# Patient Record
Sex: FEMALE | Race: BLACK OR AFRICAN AMERICAN | Employment: FULL TIME | URBAN - METROPOLITAN AREA
[De-identification: names, ages, dates, MRNs, and addresses within clinical notes are randomized per-mention and may not be internally consistent; named-entity substitution may affect disease eponyms.]

---

## 2017-09-15 ENCOUNTER — OP HISTORICAL/CONVERTED ENCOUNTER (OUTPATIENT)
Dept: OTHER | Age: 52
End: 2017-09-15

## 2018-10-05 ENCOUNTER — OP HISTORICAL/CONVERTED ENCOUNTER (OUTPATIENT)
Dept: OTHER | Age: 53
End: 2018-10-05

## 2019-10-08 ENCOUNTER — OP HISTORICAL/CONVERTED ENCOUNTER (OUTPATIENT)
Dept: OTHER | Age: 54
End: 2019-10-08

## 2019-10-18 ENCOUNTER — OP HISTORICAL/CONVERTED ENCOUNTER (OUTPATIENT)
Dept: OTHER | Age: 54
End: 2019-10-18

## 2020-07-08 ENCOUNTER — OP HISTORICAL/CONVERTED ENCOUNTER (OUTPATIENT)
Dept: OTHER | Age: 55
End: 2020-07-08

## 2021-04-01 ENCOUNTER — OFFICE VISIT (OUTPATIENT)
Dept: OBGYN CLINIC | Age: 56
End: 2021-04-01
Payer: COMMERCIAL

## 2021-04-01 VITALS
OXYGEN SATURATION: 100 % | HEART RATE: 69 BPM | HEIGHT: 67 IN | BODY MASS INDEX: 45.99 KG/M2 | TEMPERATURE: 96.4 F | DIASTOLIC BLOOD PRESSURE: 80 MMHG | SYSTOLIC BLOOD PRESSURE: 118 MMHG | RESPIRATION RATE: 16 BRPM | WEIGHT: 293 LBS

## 2021-04-01 DIAGNOSIS — Z12.4 ENCOUNTER FOR SCREENING FOR MALIGNANT NEOPLASM OF CERVIX: ICD-10-CM

## 2021-04-01 DIAGNOSIS — R10.2 PELVIC PAIN IN FEMALE: ICD-10-CM

## 2021-04-01 DIAGNOSIS — Z01.419 GYNECOLOGIC EXAM NORMAL: Primary | ICD-10-CM

## 2021-04-01 LAB
HCG URINE, QL. (POC): POSITIVE
VALID INTERNAL CONTROL?: YES

## 2021-04-01 PROCEDURE — 81025 URINE PREGNANCY TEST: CPT | Performed by: OBSTETRICS & GYNECOLOGY

## 2021-04-01 PROCEDURE — 99396 PREV VISIT EST AGE 40-64: CPT | Performed by: OBSTETRICS & GYNECOLOGY

## 2021-04-01 RX ORDER — ISOSORBIDE MONONITRATE 30 MG/1
TABLET, EXTENDED RELEASE ORAL DAILY
COMMUNITY

## 2021-04-01 RX ORDER — ASPIRIN 81 MG/1
TABLET ORAL DAILY
COMMUNITY

## 2021-04-01 RX ORDER — LOSARTAN POTASSIUM AND HYDROCHLOROTHIAZIDE 25; 100 MG/1; MG/1
1 TABLET ORAL DAILY
COMMUNITY

## 2021-04-01 RX ORDER — ROSUVASTATIN CALCIUM 20 MG/1
20 TABLET, COATED ORAL
COMMUNITY

## 2021-04-01 RX ORDER — METOPROLOL TARTRATE 100 MG/1
TABLET ORAL 2 TIMES DAILY
COMMUNITY

## 2021-04-01 NOTE — PATIENT INSTRUCTIONS

## 2021-04-01 NOTE — PROGRESS NOTES
Geetha King is a 50 y.o. female, , No LMP recorded. (Menstrual status: Menopause). , who presents today for the following:  Chief Complaint   Patient presents with    Annual Exam        No Known Allergies    Current Outpatient Medications   Medication Sig    rosuvastatin (CRESTOR) 20 mg tablet Take 20 mg by mouth nightly.  losartan-hydroCHLOROthiazide (HYZAAR) 100-25 mg per tablet Take 1 Tab by mouth daily.  isosorbide mononitrate ER (IMDUR) 30 mg tablet Take  by mouth daily.  metoprolol tartrate (LOPRESSOR) 100 mg IR tablet Take  by mouth two (2) times a day.  aspirin delayed-release 81 mg tablet Take  by mouth daily. No current facility-administered medications for this visit. Past Medical History:   Diagnosis Date    Heart disease     Hypercholesterolemia     Hypertension        History reviewed. No pertinent surgical history.     Family History   Problem Relation Age of Onset   [facility] Cancer Mother     Hypertension Mother     High Cholesterol Mother        Social History     Socioeconomic History    Marital status: SINGLE     Spouse name: Not on file    Number of children: Not on file    Years of education: Not on file    Highest education level: Not on file   Occupational History    Not on file   Social Needs    Financial resource strain: Not on file    Food insecurity     Worry: Not on file     Inability: Not on file    Transportation needs     Medical: Not on file     Non-medical: Not on file   Tobacco Use    Smoking status: Never Smoker    Smokeless tobacco: Never Used   Substance and Sexual Activity    Alcohol use: Not Currently    Drug use: Never    Sexual activity: Yes   Lifestyle    Physical activity     Days per week: Not on file     Minutes per session: Not on file    Stress: Not on file   Relationships    Social connections     Talks on phone: Not on file     Gets together: Not on file     Attends Jainism service: Not on file     Active member of club or organization: Not on file     Attends meetings of clubs or organizations: Not on file     Relationship status: Not on file    Intimate partner violence     Fear of current or ex partner: Not on file     Emotionally abused: Not on file     Physically abused: Not on file     Forced sexual activity: Not on file   Other Topics Concern    Not on file   Social History Narrative    Not on file         HPI  Annual exam  Doing well  Reported a one day episode of heavy vaginal bleeding  Was seen in the ER - no abnormalities found per patient  Reports occassional pelvic pain    Review of Systems   Constitutional: Negative. Respiratory: Negative. Cardiovascular: Negative. Gastrointestinal: Positive for abdominal pain. Genitourinary: Negative. Musculoskeletal: Negative. Skin: Negative. Neurological: Negative. Endo/Heme/Allergies: Negative. Psychiatric/Behavioral: Negative. All other systems reviewed and are negative. /80 (BP 1 Location: Left upper arm)   Pulse 69   Temp (!) 96.4 °F (35.8 °C)   Resp 16   Ht 5' 7\" (1.702 m)   Wt 307 lb (139.3 kg)   SpO2 100%   BMI 48.08 kg/m²    OBGyn Exam   Constitutional:   Chaperone: accepted and present. General Appearance: healthy-appearing, well-nourished, and well-developed; Level of Distress: NAD. Ambulation: ambulating normally. Psychiatric:   Insight: good judgement. Mental Status: normal mood and affect and active and alert. Orientation: to time, place, and person. Memory: recent memory normal and remote memory normal.     Head: Head: normocephalic and atraumatic. Neck:   Neck: supple, FROM, trachea midline, and no masses. Lymph Nodes: no cervical LAD, supraclavicular LAD, axillary LAD, or inguinal LAD. Thyroid: no enlargement or nodules and non-tender. Lungs:   Respiratory effort: no dyspnea. Cardiovascular:     Pulses including femoral / pedal: normal throughout.      Breast: Breast: no masses or abnormal secretions and normal appearance. Abdomen:    no tenderness, guarding, masses, rebound tenderness, or CVA tenderness and non-distended. Female :   External genitalia: no lesions or rash and normal.   Vagina: moist mucosa;  Cervix: no discharge, inflammation, or cervical motion tenderness   Uterus: midline, smooth, and non-tender;   Adnexae: no tenderness    Bladder and Urethra: normal bladder and urethra (except where noted). Musculoskeletal[de-identified]   Motor Strength and Tone: normal tone and motor strength. Joints, Bones, and Muscles: no contractures, malalignment, tenderness, or bony abnormalities and normal movement of all extremities. Extremities: no cyanosis, edema, varicosities, or palpable cord. Skin:   Inspection and palpation: no rash, lesions, ulcer, induration, nodules, jaundice, or abnormal nevi and good turgor. Nails: normal.     Back: Thoracolumbar Appearance: normal curvature. 1. Gynecologic exam normal    - AMB POC URINE PREGNANCY TEST, VISUAL COLOR COMPARISON  - PAP IG, CT-NG, RFX APTIMA HPV ASCUS ([id number], [id number])    2. Encounter for screening for malignant neoplasm of cervix      3.  Pelvic pain in female    - US PELV NON OBS; Future

## 2021-04-05 LAB
C TRACH RRNA CVX QL NAA+PROBE: NEGATIVE
CYTOLOGIST CVX/VAG CYTO: NORMAL
CYTOLOGY CVX/VAG DOC CYTO: NORMAL
CYTOLOGY CVX/VAG DOC THIN PREP: NORMAL
DX ICD CODE: NORMAL
LABCORP, 190119: NORMAL
Lab: NORMAL
Lab: NORMAL
N GONORRHOEA RRNA CVX QL NAA+PROBE: NEGATIVE
OTHER STN SPEC: NORMAL
STAT OF ADQ CVX/VAG CYTO-IMP: NORMAL

## 2021-08-27 ENCOUNTER — TRANSCRIBE ORDER (OUTPATIENT)
Dept: SCHEDULING | Age: 56
End: 2021-08-27

## 2021-08-27 DIAGNOSIS — Z12.31 SCREENING MAMMOGRAM FOR HIGH-RISK PATIENT: Primary | ICD-10-CM

## 2022-04-11 ENCOUNTER — OFFICE VISIT (OUTPATIENT)
Dept: OBGYN CLINIC | Age: 57
End: 2022-04-11
Payer: COMMERCIAL

## 2022-04-11 VITALS
HEART RATE: 58 BPM | OXYGEN SATURATION: 99 % | HEIGHT: 67 IN | WEIGHT: 293 LBS | SYSTOLIC BLOOD PRESSURE: 172 MMHG | DIASTOLIC BLOOD PRESSURE: 71 MMHG | RESPIRATION RATE: 16 BRPM | BODY MASS INDEX: 45.99 KG/M2

## 2022-04-11 DIAGNOSIS — Z12.4 ENCOUNTER FOR SCREENING FOR MALIGNANT NEOPLASM OF CERVIX: ICD-10-CM

## 2022-04-11 DIAGNOSIS — N76.0 VAGINITIS AND VULVOVAGINITIS: ICD-10-CM

## 2022-04-11 DIAGNOSIS — Z12.31 SCREENING MAMMOGRAM FOR BREAST CANCER: ICD-10-CM

## 2022-04-11 DIAGNOSIS — N36.8 SKENE'S DUCT CYST: ICD-10-CM

## 2022-04-11 DIAGNOSIS — Z01.419 GYNECOLOGIC EXAM NORMAL: Primary | ICD-10-CM

## 2022-04-11 DIAGNOSIS — R10.30 LOWER ABDOMINAL PAIN: ICD-10-CM

## 2022-04-11 PROCEDURE — 99396 PREV VISIT EST AGE 40-64: CPT | Performed by: OBSTETRICS & GYNECOLOGY

## 2022-04-11 RX ORDER — CEPHALEXIN 500 MG/1
500 CAPSULE ORAL 2 TIMES DAILY
Qty: 20 CAPSULE | Refills: 0 | Status: SHIPPED | OUTPATIENT
Start: 2022-04-11 | End: 2022-04-21

## 2022-04-11 RX ORDER — TERCONAZOLE 4 MG/G
1 CREAM VAGINAL
Qty: 45 G | Refills: 0 | Status: SHIPPED | OUTPATIENT
Start: 2022-04-11

## 2022-04-15 LAB
CYTOLOGIST CVX/VAG CYTO: NORMAL
CYTOLOGY CVX/VAG DOC CYTO: NORMAL
CYTOLOGY CVX/VAG DOC THIN PREP: NORMAL
DX ICD CODE: NORMAL
LABCORP, 190119: NORMAL
Lab: NORMAL
OTHER STN SPEC: NORMAL
STAT OF ADQ CVX/VAG CYTO-IMP: NORMAL

## 2022-04-17 NOTE — PROGRESS NOTES
Care Due:                  Date            Visit Type   Department     Provider  --------------------------------------------------------------------------------                                MYCHART                              ANNUAL                              CHECKUP/PHY  [facility] INTERNAL  Last Visit: 10-      [facility]       Bri Freedman  Next Visit: None Scheduled  None         None Found                                                            Last  Test          Frequency    Reason                     Performed    Due Date  --------------------------------------------------------------------------------    HBA1C.......  6 months...  metFORMIN................  10-   04-    Powered by [employer]. Reference number: [id number].   4/01/2022 10:22:12 AM CDT   Tiburcio Lehman is a 49 y.o. female, , No LMP recorded. (Menstrual status: Menopause). , who presents today for the following:  Chief Complaint   Patient presents with    Annual Exam     Pt here to get results of u/s too. No Known Allergies    Current Outpatient Medications   Medication Sig    terconazole (TERAZOL 7) 0.4 % vaginal cream Insert 1 Applicator into vagina nightly.  cephALEXin (KEFLEX) 500 mg capsule Take 1 Capsule by mouth two (2) times a day for 10 days.  rosuvastatin (CRESTOR) 20 mg tablet Take 20 mg by mouth nightly.  losartan-hydroCHLOROthiazide (HYZAAR) 100-25 mg per tablet Take 1 Tab by mouth daily.  isosorbide mononitrate ER (IMDUR) 30 mg tablet Take  by mouth daily.  metoprolol tartrate (LOPRESSOR) 100 mg IR tablet Take  by mouth two (2) times a day.  aspirin delayed-release 81 mg tablet Take  by mouth daily. No current facility-administered medications for this visit. Past Medical History:   Diagnosis Date    Heart disease     Hypercholesterolemia     Hypertension        History reviewed. No pertinent surgical history.     Family History   Problem Relation Age of Onset   García Cancer Mother     Hypertension Mother     High Cholesterol Mother        Social History     Socioeconomic History    Marital status: SINGLE     Spouse name: Not on file    Number of children: Not on file    Years of education: Not on file    Highest education level: Not on file   Occupational History    Not on file   Tobacco Use    Smoking status: Never Smoker    Smokeless tobacco: Never Used   Substance and Sexual Activity    Alcohol use: Not Currently    Drug use: Never    Sexual activity: Yes   Other Topics Concern    Not on file   Social History Narrative    Not on file     Social Determinants of Health     Financial Resource Strain:     Difficulty of Paying Living Expenses: Not on file   Food Insecurity:     Worried About Running Out of Food in the Last Year: Not on file    [address] in the Last Year: Not on file   Transportation Needs:     Lack of Transportation (Medical): Not on file    Lack of Transportation (Non-Medical): Not on file   Physical Activity:     Days of Exercise per Week: Not on file    Minutes of Exercise per Session: Not on file   Stress:     Feeling of Stress : Not on file   Social Connections:     Frequency of Communication with Friends and Family: Not on file    Frequency of Social Gatherings with Friends and Family: Not on file    Attends Jainism Services: Not on file    Active Member of [address] [place] [address] [employer] or Organizations: Not on file    Attends Club or Organization Meetings: Not on file    Marital Status: Not on file   Intimate Partner Violence:     Fear of Current or Ex-Partner: Not on file    Emotionally Abused: Not on file    Physically Abused: Not on file    Sexually Abused: Not on file   Housing Stability:     Unable to Pay for Housing in the Last Year: Not on file    Number of [place] in the Last Year: Not on file    Unstable Housing in the Last Year: Not on file         HPI  Annual exam   Reports intermittent lower abdominal pain     Review of Systems   Constitutional: Negative. Respiratory: Negative. Cardiovascular: Negative. Gastrointestinal: Positive for abdominal pain. Genitourinary: Negative. Musculoskeletal: Negative. Skin: Negative. Neurological: Negative. Endo/Heme/Allergies: Negative. Psychiatric/Behavioral: Negative. All other systems reviewed and are negative. BP (!) 172/71 (BP 1 Location: Right arm, BP Patient Position: Sitting)   Pulse (!) 58   Resp 16   Ht 5' 7\" (1.702 m)   Wt 302 lb (137 kg)   SpO2 99%   BMI 47.30 kg/m²    OBGyn Exam   Constitutional:     General Appearance: healthy-appearing, well-nourished, and well-developed; Level of Distress: NAD. Ambulation: ambulating normally. Psychiatric:   Insight: good judgement.    Mental Status: normal mood and affect and active and alert. Orientation: to time, place, and person. Memory: recent memory normal and remote memory normal.     Head: Head: normocephalic and atraumatic. Neck:   Neck: supple, FROM, trachea midline, and no masses. Lymph Nodes: no cervical LAD, supraclavicular LAD, axillary LAD, or inguinal LAD. Thyroid: no enlargement or nodules and non-tender. Lungs:   Respiratory effort: no dyspnea. Cardiovascular:     Pulses including femoral / pedal: normal throughout. Breast: Breast: no masses or abnormal secretions and normal appearance. Abdomen:    no tenderness, guarding, masses, rebound tenderness, or CVA tenderness and non-distended. Female :   External genitalia: no lesions or rash and normal. skenes gland cyst  Vagina: moist mucosa;  discharge. Cervix: no discharge, inflammation, or cervical motion tenderness   Uterus:  non-tender;  Adnexae: no  tenderness. Bladder and Urethra: normal bladder and urethra (except where noted). 1. Gynecologic exam normal    - PAP IG, RFX APTIMA HPV ASCUS ([id number])    2. Encounter for screening for malignant neoplasm of cervix    3. Screening mammogram for breast cancer    - [facility] MAMMO BI SCREENING INCL CAD; Future    4. Vaginitis and vulvovaginitis    - terconazole (TERAZOL 7) 0.4 % vaginal cream; Insert 1 Applicator into vagina nightly. Dispense: 45 g; Refill: 0    5. [place]'s duct cyst    - cephALEXin (KEFLEX) 500 mg capsule; Take 1 Capsule by mouth two (2) times a day for 10 days. Dispense: 20 Capsule; Refill: 0    6.  Lower abdominal pain    - CT ABD W WO CONT; Future

## 2022-04-26 ENCOUNTER — OFFICE VISIT (OUTPATIENT)
Dept: OBGYN CLINIC | Age: 57
End: 2022-04-26
Payer: COMMERCIAL

## 2022-04-26 VITALS
WEIGHT: 293 LBS | DIASTOLIC BLOOD PRESSURE: 61 MMHG | RESPIRATION RATE: 16 BRPM | OXYGEN SATURATION: 96 % | HEART RATE: 77 BPM | SYSTOLIC BLOOD PRESSURE: 114 MMHG | BODY MASS INDEX: 45.99 KG/M2 | HEIGHT: 67 IN

## 2022-04-26 DIAGNOSIS — N36.8 SKENE'S DUCT CYST: Primary | ICD-10-CM

## 2022-04-26 PROCEDURE — 56405 I&D VULVA/PERINEAL ABSCESS: CPT | Performed by: OBSTETRICS & GYNECOLOGY

## 2022-04-26 PROCEDURE — 99213 OFFICE O/P EST LOW 20 MIN: CPT | Performed by: OBSTETRICS & GYNECOLOGY

## 2022-04-26 RX ORDER — CLINDAMYCIN PHOSPHATE 10 MG/G
GEL TOPICAL 2 TIMES DAILY
Qty: 1 ML | Refills: 0 | Status: SHIPPED | OUTPATIENT
Start: 2022-04-26

## 2022-04-30 NOTE — PROGRESS NOTES
Rohan Jean is a 49 y.o. female, , No LMP recorded. (Menstrual status: Menopause). , who presents today for the following:  Chief Complaint   Patient presents with    Follow-up     Pt states that the cyst did not go away. No Known Allergies    Current Outpatient Medications   Medication Sig    clindamycin (CLINDAGEL) 1 % topical gel Apply  to affected area two (2) times a day. use thin film on affected area    rosuvastatin (CRESTOR) 20 mg tablet Take 20 mg by mouth nightly.  losartan-hydroCHLOROthiazide (HYZAAR) 100-25 mg per tablet Take 1 Tab by mouth daily.  isosorbide mononitrate ER (IMDUR) 30 mg tablet Take  by mouth daily.  metoprolol tartrate (LOPRESSOR) 100 mg IR tablet Take  by mouth two (2) times a day.  aspirin delayed-release 81 mg tablet Take  by mouth daily.  terconazole (TERAZOL 7) 0.4 % vaginal cream Insert 1 Applicator into vagina nightly. (Patient not taking: Reported on 2022)     No current facility-administered medications for this visit. Past Medical History:   Diagnosis Date    Heart disease     Hypercholesterolemia     Hypertension        History reviewed. No pertinent surgical history.     Family History   Problem Relation Age of Onset   [facility] Cancer Mother     Hypertension Mother     High Cholesterol Mother        Social History     Socioeconomic History    Marital status: SINGLE     Spouse name: Not on file    Number of children: Not on file    Years of education: Not on file    Highest education level: Not on file   Occupational History    Not on file   Tobacco Use    Smoking status: Never Smoker    Smokeless tobacco: Never Used   Substance and Sexual Activity    Alcohol use: Not Currently    Drug use: Never    Sexual activity: Yes   Other Topics Concern    Not on file   Social History Narrative    Not on file     Social Determinants of Health     Financial Resource Strain:     Difficulty of Paying Living Expenses: Not on file   Food Insecurity:     Worried About Running Out of Food in the Last Year: Not on file    [employer] in the Last Year: Not on file   Transportation Needs:     Lack of Transportation (Medical): Not on file    Lack of Transportation (Non-Medical): Not on file   Physical Activity:     Days of Exercise per Week: Not on file    Minutes of Exercise per Session: Not on file   Stress:     Feeling of Stress : Not on file   Social Connections:     Frequency of Communication with Friends and Family: Not on file    Frequency of Social Gatherings with Friends and Family: Not on file    Attends Taoist Services: Not on file    Active Member of [address] [place] [address] [employer] or Organizations: Not on file    Attends Club or Organization Meetings: Not on file    Marital Status: Not on file   Intimate Partner Violence:     Fear of Current or Ex-Partner: Not on file    Emotionally Abused: Not on file    Physically Abused: Not on file    Sexually Abused: Not on file   Housing Stability:     Unable to Pay for Housing in the Last Year: Not on file    Number of [place] in the Last Year: Not on file    Unstable Housing in the Last Year: Not on file         HPI   Patient presents presents for follow up  Previously dx with skene's duct cyst  Complete treatment of antibiotics. Reports cyst still present although has decreased minimally with antibiotic therapy  Some vaginal discomfort      Review of Systems   Constitutional: Negative. Respiratory: Negative. Cardiovascular: Negative. Gastrointestinal: Negative. Genitourinary: Negative. Musculoskeletal: Negative. Skin: Negative. Neurological: Negative. Endo/Heme/Allergies: Negative. Psychiatric/Behavioral: Negative. All other systems reviewed and are negative.          /61 (BP 1 Location: Left upper arm, BP Patient Position: Sitting)   Pulse 77   Resp 16   Ht 5' 7\" (1.702 m)   Wt 305 lb (138.3 kg)   SpO2 96%   BMI 47.77 kg/m²    OBGyn Exam PE:  Constitutional: General Appearance: healthy-appearing, well-nourished, well-developed, and well groomed. Psychiatric: Orientation: to time, place, and person. Mood and Affect: normal mood and affect and appropriate and active and alert. Abdomen: Auscultation/Inspection/Palpation: tenderness and mass and non-distended. Hernia: none palpated. Female Genitalia: Vulva: no  Lesions. Left cyst     Vagina no tenderness, erythema, cystocele, rectocele, abnormal vaginal discharge, or vesicle(s) or ulcers. Cervix: no discharge or cervical motion tenderness and grossly normal.     Area prepped with betadine  Using 11 blade cyst I&D without difficulty  Drainage expressed  Min bleeding  Patient tolerated procedure well    1. [place]'s duct cyst    - I&D OF VULVA/PERINEUM ABSCESS  - clindamycin (CLINDAGEL) 1 % topical gel; Apply  to affected area two (2) times a day. use thin film on affected area  Dispense: 1 mL; Refill: 0        Follow-up and Dispositions    · Return in about 2 weeks (around 5/10/2022) for gyn.

## 2022-05-11 ENCOUNTER — TRANSCRIBE ORDER (OUTPATIENT)
Dept: SCHEDULING | Age: 57
End: 2022-05-11

## 2022-05-11 DIAGNOSIS — Z12.31 VISIT FOR SCREENING MAMMOGRAM: Primary | ICD-10-CM

## 2022-05-12 ENCOUNTER — OFFICE VISIT (OUTPATIENT)
Dept: OBGYN CLINIC | Age: 57
End: 2022-05-12
Payer: COMMERCIAL

## 2022-05-12 VITALS
HEIGHT: 67 IN | BODY MASS INDEX: 45.99 KG/M2 | SYSTOLIC BLOOD PRESSURE: 135 MMHG | DIASTOLIC BLOOD PRESSURE: 78 MMHG | WEIGHT: 293 LBS

## 2022-05-12 DIAGNOSIS — N36.8 SKENE'S DUCT CYST: Primary | ICD-10-CM

## 2022-05-12 PROCEDURE — 99213 OFFICE O/P EST LOW 20 MIN: CPT | Performed by: OBSTETRICS & GYNECOLOGY

## 2022-05-12 NOTE — PROGRESS NOTES
Jose Johnson is a 49 y.o. female, , No LMP recorded. (Menstrual status: Menopause). , who presents today for the following:  Chief Complaint   Patient presents with    Follow-up     Pt here to f/u on cyst.        No Known Allergies    Current Outpatient Medications   Medication Sig    clindamycin (CLINDAGEL) 1 % topical gel Apply  to affected area two (2) times a day. use thin film on affected area    terconazole (TERAZOL 7) 0.4 % vaginal cream Insert 1 Applicator into vagina nightly. (Patient not taking: Reported on 2022)    rosuvastatin (CRESTOR) 20 mg tablet Take 20 mg by mouth nightly.  losartan-hydroCHLOROthiazide (HYZAAR) 100-25 mg per tablet Take 1 Tab by mouth daily.  isosorbide mononitrate ER (IMDUR) 30 mg tablet Take  by mouth daily.  metoprolol tartrate (LOPRESSOR) 100 mg IR tablet Take  by mouth two (2) times a day.  aspirin delayed-release 81 mg tablet Take  by mouth daily. No current facility-administered medications for this visit. Past Medical History:   Diagnosis Date    Heart disease     Hypercholesterolemia     Hypertension        History reviewed. No pertinent surgical history.     Family History   Problem Relation Age of Onset   García Cancer Mother     Hypertension Mother     High Cholesterol Mother        Social History     Socioeconomic History    Marital status: SINGLE     Spouse name: Not on file    Number of children: Not on file    Years of education: Not on file    Highest education level: Not on file   Occupational History    Not on file   Tobacco Use    Smoking status: Never Smoker    Smokeless tobacco: Never Used   Substance and Sexual Activity    Alcohol use: Not Currently    Drug use: Never    Sexual activity: Yes   Other Topics Concern    Not on file   Social History Narrative    Not on file     Social Determinants of Health     Financial Resource Strain:     Difficulty of Paying Living Expenses: Not on file   Food Insecurity:     Worried About [facility] in the Last Year: Not on file    [employer] Food in the Last Year: Not on file   Transportation Needs:     Lack of Transportation (Medical): Not on file    Lack of Transportation (Non-Medical): Not on file   Physical Activity:     Days of Exercise per Week: Not on file    Minutes of Exercise per Session: Not on file   Stress:     Feeling of Stress : Not on file   Social Connections:     Frequency of Communication with Friends and Family: Not on file    Frequency of Social Gatherings with Friends and Family: Not on file    Attends Faith Services: Not on file    Active Member of [address] [place] [address] [employer] or Organizations: Not on file    Attends Club or Organization Meetings: Not on file    Marital Status: Not on file   Intimate Partner Violence:     Fear of Current or Ex-Partner: Not on file    Emotionally Abused: Not on file    Physically Abused: Not on file    Sexually Abused: Not on file   Housing Stability:     Unable to Pay for Housing in the Last Year: Not on file    Number of [place] in the Last Year: Not on file    Unstable Housing in the Last Year: Not on file         HPI  Patient presents for follow up   S/p I&D of [place] cyst  Sates she can palpate \"small knot\" that is still present   Reports overall improvement  No complications following I&D    Review of Systems   Constitutional: Negative. Respiratory: Negative. Cardiovascular: Negative. Gastrointestinal: Negative. Genitourinary: Negative. Musculoskeletal: Negative. Skin: Negative. Neurological: Negative. Endo/Heme/Allergies: Negative. Psychiatric/Behavioral: Negative. All other systems reviewed and are negative. /78 (BP 1 Location: Right arm, BP Patient Position: Sitting)   Ht 5' 7\" (1.702 m)   Wt 303 lb (137.4 kg)   BMI 47.46 kg/m²    OBGyn Exam     Constitutional: General Appearance: healthy-appearing, well-nourished, well-developed, and well groomed.    Psychiatric: Orientation: to time, place, and person. Mood and Affect: normal mood and affect and appropriate and active and alert.      Abdomen: Auscultation/Inspection/Palpation: tenderness and mass and non-distended. Hernia: none palpated.      Female Genitalia: Vulva: no  Lesions. Area of I&D , small area collection present and expressed without difficultly      Vagina no tenderness, erythema, cystocele, rectocele, abnormal vaginal discharge, or vesicle(s) or ulcers.      Cervix: no discharge or cervical motion tenderness and grossly normal    1.  [place]'s duct cyst  reassurance

## 2022-05-19 ENCOUNTER — HOSPITAL ENCOUNTER (OUTPATIENT)
Dept: MAMMOGRAPHY | Age: 57
Discharge: HOME | End: 2022-05-19
Attending: OBSTETRICS & GYNECOLOGY
Payer: COMMERCIAL

## 2022-05-19 DIAGNOSIS — Z12.31 VISIT FOR SCREENING MAMMOGRAM: ICD-10-CM

## 2022-05-19 PROCEDURE — 77063 BREAST TOMOSYNTHESIS BI: CPT

## 2022-07-06 ENCOUNTER — HOSPITAL ENCOUNTER (OUTPATIENT)
Dept: CT IMAGING | Age: 57
Discharge: HOME | End: 2022-07-06
Attending: OBSTETRICS & GYNECOLOGY
Payer: COMMERCIAL

## 2022-07-06 DIAGNOSIS — R10.30 LOWER ABDOMINAL PAIN: ICD-10-CM

## 2022-07-06 PROCEDURE — 74177 CT ABD & PELVIS W/CONTRAST: CPT

## 2022-07-06 PROCEDURE — 74011000636 HC RX REV CODE- 636: Performed by: OBSTETRICS & GYNECOLOGY

## 2022-07-06 RX ADMIN — IOPAMIDOL 100 ML: 755 INJECTION, SOLUTION INTRAVENOUS at 09:50

## 2022-07-29 ENCOUNTER — TELEPHONE (OUTPATIENT)
Dept: OBGYN CLINIC | Age: 57
End: 2022-07-29

## 2022-07-29 NOTE — TELEPHONE ENCOUNTER
Patient called for the results of her CT scan. Advised her per Dr Vilma Martinez, there is no acute intra-abdominal pathology.   She was advised to follow up with her PCP or a gastroenterologist.

## 2023-02-24 ENCOUNTER — OFFICE VISIT (OUTPATIENT)
Dept: OBGYN CLINIC | Age: 58
End: 2023-02-24
Payer: COMMERCIAL

## 2023-02-24 VITALS
SYSTOLIC BLOOD PRESSURE: 127 MMHG | OXYGEN SATURATION: 100 % | HEART RATE: 71 BPM | WEIGHT: 293 LBS | DIASTOLIC BLOOD PRESSURE: 71 MMHG | TEMPERATURE: 96.3 F | RESPIRATION RATE: 18 BRPM | HEIGHT: 67 IN | BODY MASS INDEX: 45.99 KG/M2

## 2023-02-24 DIAGNOSIS — N76.0 VAGINITIS AND VULVOVAGINITIS: ICD-10-CM

## 2023-02-24 DIAGNOSIS — N39.0 URINARY TRACT INFECTION WITH HEMATURIA, SITE UNSPECIFIED: Primary | ICD-10-CM

## 2023-02-24 DIAGNOSIS — R31.9 URINARY TRACT INFECTION WITH HEMATURIA, SITE UNSPECIFIED: Primary | ICD-10-CM

## 2023-02-24 PROCEDURE — 99213 OFFICE O/P EST LOW 20 MIN: CPT | Performed by: OBSTETRICS & GYNECOLOGY

## 2023-02-24 RX ORDER — CIPROFLOXACIN 500 MG/1
500 TABLET ORAL 2 TIMES DAILY
Qty: 14 TABLET | Refills: 0 | Status: SHIPPED | OUTPATIENT
Start: 2023-02-24 | End: 2023-03-03

## 2023-02-24 RX ORDER — TERCONAZOLE 4 MG/G
1 CREAM VAGINAL
Qty: 45 G | Refills: 0 | Status: SHIPPED | OUTPATIENT
Start: 2023-02-24

## 2023-02-28 NOTE — PROGRESS NOTES
James Medrano is a 56 y.o. female, , No LMP recorded. (Menstrual status: Menopause). , who presents today for the following:  Chief Complaint   Patient presents with    Pelvic Pain        No Known Allergies    Current Outpatient Medications   Medication Sig    ciprofloxacin HCl (CIPRO) 500 mg tablet Take 1 Tablet by mouth two (2) times a day for 7 days.  terconazole (TERAZOL 7) 0.4 % vaginal cream Insert 1 Applicator into vagina nightly.  rosuvastatin (CRESTOR) 20 mg tablet Take 20 mg by mouth nightly.  losartan-hydroCHLOROthiazide (HYZAAR) 100-25 mg per tablet Take 1 Tab by mouth daily.  isosorbide mononitrate ER (IMDUR) 30 mg tablet Take  by mouth daily.  metoprolol tartrate (LOPRESSOR) 100 mg IR tablet Take  by mouth two (2) times a day.  aspirin delayed-release 81 mg tablet Take  by mouth daily.  clindamycin (CLINDAGEL) 1 % topical gel Apply  to affected area two (2) times a day. use thin film on affected area (Patient not taking: Reported on 2023)    terconazole (TERAZOL 7) 0.4 % vaginal cream Insert 1 Applicator into vagina nightly. (Patient not taking: No sig reported)     No current facility-administered medications for this visit. Past Medical History:   Diagnosis Date    Heart disease     Hypercholesterolemia     Hypertension        History reviewed. No pertinent surgical history.     Family History   Problem Relation Age of Onset   [de-identified] Cancer Mother     Hypertension Mother     High Cholesterol Mother        Social History     Socioeconomic History    Marital status: SINGLE     Spouse name: Not on file    Number of children: Not on file    Years of education: Not on file    Highest education level: Not on file   Occupational History    Not on file   Tobacco Use    Smoking status: Never    Smokeless tobacco: Never   Substance and Sexual Activity    Alcohol use: Not Currently    Drug use: Never    Sexual activity: Yes   Other Topics Concern    Not on file Social History Narrative    Not on file     Social Determinants of Health     Financial Resource Strain: Not on file   Food Insecurity: Not on file   Transportation Needs: Not on file   Physical Activity: Not on file   Stress: Not on file   Social Connections: Not on file   Intimate Partner Violence: Not on file   Housing Stability: Not on file         Pelvic Pain  HPI  Reported by patient. Location: abdominal   Onset/Timing: > 2 weeks  Duration: intermittent  Quality: aching; cramping; pressure; fullness/bloating   Severity: interferes with normal activities     Aggravating Factors: urination  Associated Symptoms: no back pain; no chills; no constipation; no diarrhea; no vaginal discharge;  pain with urination; normal emptying of bladder; feelings of urgency; no blood in the urine; normal libido; no fever; no nausea; no vomiting; no nocturia; Review of Systems   Constitutional: Negative. Respiratory: Negative. Cardiovascular: Negative. Gastrointestinal: Negative. Genitourinary:  Positive for dysuria, frequency and urgency. Musculoskeletal: Negative. Skin: Negative. Neurological: Negative. Endo/Heme/Allergies: Negative. Psychiatric/Behavioral: Negative. All other systems reviewed and are negative. /71 (BP 1 Location: Right upper arm, BP Patient Position: Sitting)   Pulse 71   Temp (!) 96.3 °F (35.7 °C)   Resp 18   Ht 5' 7\" (1.702 m)   Wt 313 lb (142 kg)   SpO2 100%   BMI 49.02 kg/m²    OBGyn Exam     PE:  Constitutional: General Appearance: healthy-appearing, well-nourished, well-developed, and well groomed. Psychiatric: Orientation: to time, place, and person. Mood and Affect: normal mood and affect and appropriate and active and alert. Abdomen: Auscultation/Inspection/Palpation:suprapubic tenderness and mass and non-distended. Hernia: none palpated. Female Genitalia: Vulva: no masses, atrophy, or lesions.     Bladder/Urethra: no urethral discharge or mass and normal meatus and bladder non distended. Vagina no tenderness, erythema, cystocele, rectocele, scant abnormal vaginal discharge,     Cervix: no discharge or cervical motion tenderness and grossly normal.     Uterus: mobile, non-tender, and no uterine prolapse. Adnexa/Parametria: no adnexal tenderness. 1. Urinary tract infection with hematuria, site unspecified    - ciprofloxacin HCl (CIPRO) 500 mg tablet; Take 1 Tablet by mouth two (2) times a day for 7 days. Dispense: 14 Tablet; Refill: 0    2. Vaginitis and vulvovaginitis    - terconazole (TERAZOL 7) 0.4 % vaginal cream; Insert 1 Applicator into vagina nightly.   Dispense: 45 g; Refill: 0

## 2023-04-20 ENCOUNTER — OFFICE VISIT (OUTPATIENT)
Dept: OBGYN CLINIC | Age: 58
End: 2023-04-20

## 2023-04-20 VITALS
BODY MASS INDEX: 45.99 KG/M2 | HEIGHT: 67 IN | OXYGEN SATURATION: 97 % | SYSTOLIC BLOOD PRESSURE: 130 MMHG | DIASTOLIC BLOOD PRESSURE: 68 MMHG | WEIGHT: 293 LBS | RESPIRATION RATE: 16 BRPM | HEART RATE: 76 BPM

## 2023-04-20 DIAGNOSIS — N76.0 VAGINITIS AND VULVOVAGINITIS: ICD-10-CM

## 2023-04-20 DIAGNOSIS — R10.2 PELVIC PAIN: ICD-10-CM

## 2023-04-20 DIAGNOSIS — Z01.419 GYNECOLOGIC EXAM NORMAL: Primary | ICD-10-CM

## 2023-04-20 DIAGNOSIS — Z12.4 ENCOUNTER FOR SCREENING FOR MALIGNANT NEOPLASM OF CERVIX: ICD-10-CM

## 2023-04-20 RX ORDER — FLUCONAZOLE 150 MG/1
150 TABLET ORAL DAILY
Qty: 1 TABLET | Refills: 0 | Status: SHIPPED | OUTPATIENT
Start: 2023-04-20 | End: 2023-04-21

## 2023-04-20 NOTE — PATIENT INSTRUCTIONS
Breast Self-Exam: Care Instructions  Your Care Instructions     A breast self-exam is when you check your breasts for lumps or changes. This regular exam helps you learn how your breasts normally look and feel. Most breast problems or changes are not because of cancer. Breast self-exam is not a substitute for a mammogram. Having regular breast exams by your doctor and regular mammograms improve your chances of finding any problems with your breasts. Some women set a time each month to do a step-by-step breast self-exam. Other women like a less formal system. They might look at their breasts as they brush their teeth, or feel their breasts once in a while in the shower. If you notice a change in your breast, tell your doctor. Follow-up care is a key part of your treatment and safety. Be sure to make and go to all appointments, and call your doctor if you are having problems. It's also a good idea to know your test results and keep a list of the medicines you take. How do you do a breast self-exam?  The best time to examine your breasts is usually one week after your menstrual period begins. Your breasts should not be tender then. If you do not have periods, you might do your exam on a day of the month that is easy to remember. To examine your breasts:  Remove all your clothes above the waist and lie down. When you are lying down, your breast tissue spreads evenly over your chest wall, which makes it easier to feel all your breast tissue. Use the pads--not the fingertips--of the 3 middle fingers of your left hand to check your right breast. Move your fingers slowly in small coin-sized circles that overlap. Use three levels of pressure to feel of all your breast tissue. Use light pressure to feel the tissue close to the skin surface. Use medium pressure to feel a little deeper. Use firm pressure to feel your tissue close to your breastbone and ribs.  Use each pressure level to feel your breast tissue before moving on to the next spot. Check your entire breast, moving up and down as if following a strip from the collarbone to the bra line, and from the armpit to the ribs. Repeat until you have covered the entire breast.  Repeat this procedure for your left breast, using the pads of the 3 middle fingers of your right hand. To examine your breasts while in the shower:  Place one arm over your head and lightly soap your breast on that side. Using the pads of your fingers, gently move your hand over your breast (in the strip pattern described above), feeling carefully for any lumps or changes. Repeat for the other breast.  Have your doctor inspect anything you notice to see if you need further testing. Where can you learn more? Go to http://www.gray.com/  Enter P148 in the search box to learn more about \"Breast Self-Exam: Care Instructions. \"  Current as of: August 2, 2022               Content Version: 13.6  © 2006-2023 [employer]. Care instructions adapted under license by [employer] (which disclaims liability or warranty for this information). If you have questions about a medical condition or this instruction, always ask your healthcare professional. [place] [address] any warranty or liability for your use of this information. Vaginal Yeast Infection: Care Instructions  Overview     A vaginal yeast infection is the growth of too many yeast cells in the vagina. This is a common problem. Itching, vaginal discharge and irritation, and other symptoms can bother you. But yeast infections don't often cause other health problems. Some medicines can increase your risk of getting a yeast infection. These include antibiotics, hormones, and steroids. You may also be more likely to get a yeast infection if you are pregnant, have diabetes, douche, or wear tight clothes. With treatment, most yeast infections get better in a few days.   Follow-up care is a key part of your treatment and safety. Be sure to make and go to all appointments, and call your doctor if you are having problems. It's also a good idea to know your test results and keep a list of the medicines you take. How can you care for yourself at home? Take your medicines exactly as prescribed. Call your doctor if you think you are having a problem with your medicine. Ask your doctor about over-the-counter (OTC) medicines for yeast infections. If you use an OTC treatment, read and follow all instructions on the label. Don't use tampons while using a vaginal cream or suppository. The tampons can absorb the medicine. Use pads instead. Wear loose cotton clothing. Don't wear nylon or other fabric that holds body heat and moisture close to the skin. Try sleeping without underwear. Don't scratch. Relieve itching with a cold pack or a cool bath. Don't wash your vulva more than once a day. Use plain water or a mild, unscented soap. Air-dry the vulva. Change out of wet or damp clothes as soon as possible. If you are using a vaginal medicine, don't have sex until you have finished your treatment. But if you do have sex, don't depend on a condom or diaphragm for birth control. The oil in some vaginal medicines weakens latex. Don't douche or use powders, sprays, or perfumes in your vagina or on your vulva. These items can change the normal balance of organisms in your vagina. When should you call for help? Call your doctor now or seek immediate medical care if:    You have new or increased pain in your vagina or pelvis. Watch closely for changes in your health, and be sure to contact your doctor if:    You have unexpected vaginal bleeding. You have a fever. You are not getting better after 2 days. Your symptoms come back after you finish your medicines. Where can you learn more?   Go to http://www.gray.com/  Enter C019 in the search box to learn more about \"Vaginal Yeast Infection: Care Instructions. \"  Current as of: August 2, 2022               Content Version: 13.6  © 0185-3182 [employer], [facility]. Care instructions adapted under license by [employer] (which disclaims liability or warranty for this information). If you have questions about a medical condition or this instruction, always ask your healthcare professional. [address] any warranty or liability for your use of this information.

## 2023-04-25 LAB
CYTOLOGIST CVX/VAG CYTO: NORMAL
CYTOLOGY CVX/VAG DOC CYTO: NORMAL
CYTOLOGY CVX/VAG DOC THIN PREP: NORMAL
DX ICD CODE: NORMAL
LABCORP, 190119: NORMAL
Lab: NORMAL
Lab: NORMAL
OTHER STN SPEC: NORMAL
STAT OF ADQ CVX/VAG CYTO-IMP: NORMAL

## 2023-05-12 DIAGNOSIS — R10.32 LLQ PAIN: ICD-10-CM

## 2023-05-12 DIAGNOSIS — R10.2 PELVIC PAIN: Primary | ICD-10-CM

## 2024-07-05 ENCOUNTER — TELEPHONE (OUTPATIENT)
Age: 59
End: 2024-07-05

## 2024-07-05 ENCOUNTER — OFFICE VISIT (OUTPATIENT)
Age: 59
End: 2024-07-05

## 2024-07-05 VITALS
HEIGHT: 67 IN | HEART RATE: 66 BPM | WEIGHT: 293 LBS | DIASTOLIC BLOOD PRESSURE: 76 MMHG | SYSTOLIC BLOOD PRESSURE: 128 MMHG | BODY MASS INDEX: 45.99 KG/M2 | OXYGEN SATURATION: 98 %

## 2024-07-05 DIAGNOSIS — Z12.4 ENCOUNTER FOR SCREENING FOR MALIGNANT NEOPLASM OF CERVIX: ICD-10-CM

## 2024-07-05 DIAGNOSIS — N76.0 ACUTE VAGINITIS: ICD-10-CM

## 2024-07-05 DIAGNOSIS — Z12.39 SCREENING BREAST EXAMINATION: ICD-10-CM

## 2024-07-05 DIAGNOSIS — Z01.419 ENCOUNTER FOR GYNECOLOGICAL EXAMINATION (GENERAL) (ROUTINE) WITHOUT ABNORMAL FINDINGS: Primary | ICD-10-CM

## 2024-07-05 RX ORDER — FLUCONAZOLE 150 MG/1
150 TABLET ORAL DAILY
Qty: 1 TABLET | Refills: 3 | Status: SHIPPED | OUTPATIENT
Start: 2024-07-05

## 2024-07-05 ASSESSMENT — PATIENT HEALTH QUESTIONNAIRE - PHQ9
2. FEELING DOWN, DEPRESSED OR HOPELESS: SEVERAL DAYS
SUM OF ALL RESPONSES TO PHQ9 QUESTIONS 1 & 2: 2
SUM OF ALL RESPONSES TO PHQ QUESTIONS 1-9: 2
1. LITTLE INTEREST OR PLEASURE IN DOING THINGS: SEVERAL DAYS
SUM OF ALL RESPONSES TO PHQ QUESTIONS 1-9: 2

## 2024-07-05 NOTE — TELEPHONE ENCOUNTER
Pt requesting to have order for \"mammogram sent to the [facility]. Callback requested when completed.

## 2024-07-05 NOTE — PROGRESS NOTES
Mariola Dougherty is a 58 y.o. female, , No LMP recorded. (Menstrual status: Menopause)., who presents today for the following:  Chief Complaint   Patient presents with   • Annual Exam        No Known Allergies    Current Outpatient Medications   Medication Sig Dispense Refill   • fluconazole (DIFLUCAN) 150 MG tablet Take 1 tablet by mouth daily 1 tablet 3   • aspirin 81 MG EC tablet Take by mouth daily     • isosorbide mononitrate (IMDUR) 30 MG extended release tablet Take by mouth daily     • losartan-hydroCHLOROthiazide (HYZAAR) 100-25 MG per tablet Take 1 tablet by mouth daily     • metoprolol (LOPRESSOR) 100 MG tablet Take by mouth 2 times daily     • rosuvastatin (CRESTOR) 20 MG tablet Take 1 tablet by mouth       No current facility-administered medications for this visit.         Past Medical History:   Diagnosis Date   • Heart disease    • Hypercholesterolemia    • Hypertension        History reviewed. No pertinent surgical history.    Family History   Problem Relation Age of Onset   • Cancer Mother    • Hypertension Mother    • High Cholesterol Mother        Social History     Socioeconomic History   • Marital status: Single     Spouse name: Not on file   • Number of children: Not on file   • Years of education: Not on file   • Highest education level: Not on file   Occupational History   • Not on file   Tobacco Use   • Smoking status: Never   • Smokeless tobacco: Never   Substance and Sexual Activity   • Alcohol use: Not Currently   • Drug use: Never   • Sexual activity: Yes     Partners: Male   Other Topics Concern   • Not on file   Social History Narrative   • Not on file     Social Determinants of Health     Financial Resource Strain: Not on file   Food Insecurity: Not on file   Transportation Needs: Not on file   Physical Activity: Not on file   Stress: Not on file   Social Connections: Not on file   Intimate Partner Violence: Not on file   Housing Stability: Not on file         [facility]  Annual

## 2024-07-05 NOTE — TELEPHONE ENCOUNTER
Returned the patient's call and left a message that a mammogram order was entered by Dr Lopez this morning and she should receive a call from scheduling or she may contact them at [phone].

## 2024-07-05 NOTE — PROGRESS NOTES
1. \"Have you been to the ER, urgent care clinic since your last visit?  Hospitalized since your last visit?\" No    2. \"Have you seen or consulted any other health care providers outside of the [facility] since your last visit?\" Yes When: Earlier this year Where: [facility]      3. For patients aged 45-75: Has the patient had a colonoscopy / FIT/ Cologuard? Yes - no Care Gap present      If the patient is female:    4. For patients aged 40-74: Has the patient had a mammogram within the past 2 years? Yes - no Care Gap present      5. For patients aged 21-65: Has the patient had a pap smear? Yes - no Care Gap present

## 2024-07-10 LAB
CYTOLOGIST CVX/VAG CYTO: NORMAL
CYTOLOGY CVX/VAG DOC CYTO: NORMAL
CYTOLOGY CVX/VAG DOC THIN PREP: NORMAL
DX ICD CODE: NORMAL
HPV GENOTYPE REFLEX: NORMAL
HPV I/H RISK 4 DNA CVX QL PROBE+SIG AMP: NEGATIVE
Lab: NORMAL
OTHER STN SPEC: NORMAL
STAT OF ADQ CVX/VAG CYTO-IMP: NORMAL

## 2024-07-17 ENCOUNTER — HOSPITAL ENCOUNTER (OUTPATIENT)
Facility: HOSPITAL | Age: 59
Discharge: HOME | End: 2024-07-20
Attending: OBSTETRICS & GYNECOLOGY
Payer: COMMERCIAL

## 2024-07-17 VITALS — HEIGHT: 67 IN | WEIGHT: 293 LBS | BODY MASS INDEX: 45.99 KG/M2

## 2024-07-17 DIAGNOSIS — Z12.39 SCREENING BREAST EXAMINATION: ICD-10-CM

## 2024-07-17 PROCEDURE — 77063 BREAST TOMOSYNTHESIS BI: CPT

## 2024-11-11 ENCOUNTER — TELEPHONE (OUTPATIENT)
Age: 59
End: 2024-11-11

## 2024-11-11 NOTE — TELEPHONE ENCOUNTER
11/11/2024-Pt came into the  office wanting to be seen for blood in stool. After speaking w/CML, informed patient she would need to see her PCP.ww

## 2025-07-07 ENCOUNTER — TRANSCRIBE ORDERS (OUTPATIENT)
Facility: HOSPITAL | Age: 60
End: 2025-07-07

## 2025-07-07 DIAGNOSIS — Z12.31 OTHER SCREENING MAMMOGRAM: Primary | ICD-10-CM

## 2025-08-01 ENCOUNTER — OFFICE VISIT (OUTPATIENT)
Age: 60
End: 2025-08-01
Payer: COMMERCIAL

## 2025-08-01 ENCOUNTER — HOSPITAL ENCOUNTER (OUTPATIENT)
Facility: HOSPITAL | Age: 60
Discharge: HOME | End: 2025-08-01
Attending: INTERNAL MEDICINE
Payer: COMMERCIAL

## 2025-08-01 VITALS
RESPIRATION RATE: 18 BRPM | WEIGHT: 293 LBS | DIASTOLIC BLOOD PRESSURE: 72 MMHG | OXYGEN SATURATION: 99 % | HEART RATE: 78 BPM | HEIGHT: 67 IN | SYSTOLIC BLOOD PRESSURE: 134 MMHG | BODY MASS INDEX: 45.99 KG/M2

## 2025-08-01 DIAGNOSIS — Z12.31 OTHER SCREENING MAMMOGRAM: ICD-10-CM

## 2025-08-01 DIAGNOSIS — N76.0 ACUTE VAGINITIS: ICD-10-CM

## 2025-08-01 DIAGNOSIS — Z01.419 ENCOUNTER FOR GYNECOLOGICAL EXAMINATION (GENERAL) (ROUTINE) WITHOUT ABNORMAL FINDINGS: Primary | ICD-10-CM

## 2025-08-01 DIAGNOSIS — Z12.4 ENCOUNTER FOR SCREENING FOR MALIGNANT NEOPLASM OF CERVIX: ICD-10-CM

## 2025-08-01 PROCEDURE — 77063 BREAST TOMOSYNTHESIS BI: CPT

## 2025-08-01 PROCEDURE — 99396 PREV VISIT EST AGE 40-64: CPT | Performed by: OBSTETRICS & GYNECOLOGY

## 2025-08-01 RX ORDER — FLUCONAZOLE 150 MG/1
150 TABLET ORAL ONCE
Qty: 1 TABLET | Refills: 0 | Status: SHIPPED | OUTPATIENT
Start: 2025-08-01 | End: 2025-08-01

## 2025-08-05 LAB
., LABCORP: NORMAL
CYTOLOGIST CVX/VAG CYTO: NORMAL
CYTOLOGY CVX/VAG DOC CYTO: NORMAL
CYTOLOGY CVX/VAG DOC THIN PREP: NORMAL
DX ICD CODE: NORMAL
OTHER STN SPEC: NORMAL
SERVICE CMNT-IMP: NORMAL
STAT OF ADQ CVX/VAG CYTO-IMP: NORMAL